# Patient Record
Sex: MALE | URBAN - METROPOLITAN AREA
[De-identification: names, ages, dates, MRNs, and addresses within clinical notes are randomized per-mention and may not be internally consistent; named-entity substitution may affect disease eponyms.]

---

## 2021-10-20 ENCOUNTER — OFFICE VISIT (OUTPATIENT)
Dept: URBAN - METROPOLITAN AREA CLINIC 48 | Facility: CLINIC | Age: 66
End: 2021-10-20
Payer: COMMERCIAL

## 2021-10-20 DIAGNOSIS — H25.813 COMBINED FORMS OF AGE-RELATED CATARACT, BILATERAL: ICD-10-CM

## 2021-10-20 DIAGNOSIS — H43.811 VITREOUS DEGENERATION, RIGHT EYE: ICD-10-CM

## 2021-10-20 DIAGNOSIS — H20.041 SECONDARY NONINFECTIOUS IRIDOCYCLITIS, RIGHT EYE: Primary | ICD-10-CM

## 2021-10-20 PROCEDURE — 92004 COMPRE OPH EXAM NEW PT 1/>: CPT | Performed by: OPHTHALMOLOGY

## 2021-10-20 RX ORDER — PREDNISOLONE ACETATE 10 MG/ML
1 % SUSPENSION/ DROPS OPHTHALMIC
Qty: 5 | Refills: 1 | Status: ACTIVE
Start: 2021-10-20

## 2021-10-20 RX ORDER — ATROPINE SULFATE 10 MG/ML
1 % SOLUTION/ DROPS OPHTHALMIC
Qty: 5 | Refills: 1 | Status: ACTIVE
Start: 2021-10-20

## 2021-10-20 ASSESSMENT — INTRAOCULAR PRESSURE
OS: 16
OD: 16

## 2021-10-20 NOTE — IMPRESSION/PLAN
Impression: Vitreous degeneration, right eye: H43.811. Plan: 

floater since injury in the right eye B scans shoes floaters but no RD

## 2021-10-20 NOTE — IMPRESSION/PLAN
Impression: Combined forms of age-related cataract, bilateral: H25.813. Plan: mixed cat OD: OD traumatic cataract 
phacodonasis and iridodonesis  OD

## 2021-10-20 NOTE — IMPRESSION/PLAN
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 Plan: iritis secondary Start:
PF 6 times a day OD
atropine bid OD Wednesday with Dr. Bob Alvarez to [facility] [place]